# Patient Record
Sex: FEMALE | Race: BLACK OR AFRICAN AMERICAN
[De-identification: names, ages, dates, MRNs, and addresses within clinical notes are randomized per-mention and may not be internally consistent; named-entity substitution may affect disease eponyms.]

---

## 2018-02-10 ENCOUNTER — HOSPITAL ENCOUNTER (OUTPATIENT)
Dept: HOSPITAL 17 - NEPE | Age: 48
Setting detail: OBSERVATION
LOS: 1 days | Discharge: HOME | End: 2018-02-11
Payer: COMMERCIAL

## 2018-02-10 VITALS
HEART RATE: 79 BPM | DIASTOLIC BLOOD PRESSURE: 112 MMHG | SYSTOLIC BLOOD PRESSURE: 235 MMHG | RESPIRATION RATE: 16 BRPM | OXYGEN SATURATION: 98 % | TEMPERATURE: 98.8 F

## 2018-02-10 VITALS
HEART RATE: 75 BPM | DIASTOLIC BLOOD PRESSURE: 107 MMHG | SYSTOLIC BLOOD PRESSURE: 185 MMHG | RESPIRATION RATE: 18 BRPM | OXYGEN SATURATION: 100 %

## 2018-02-10 DIAGNOSIS — I25.10: ICD-10-CM

## 2018-02-10 DIAGNOSIS — E78.5: ICD-10-CM

## 2018-02-10 DIAGNOSIS — R07.9: Primary | ICD-10-CM

## 2018-02-10 DIAGNOSIS — F17.200: ICD-10-CM

## 2018-02-10 DIAGNOSIS — I16.9: ICD-10-CM

## 2018-02-10 DIAGNOSIS — E11.9: ICD-10-CM

## 2018-02-10 DIAGNOSIS — Z83.3: ICD-10-CM

## 2018-02-10 DIAGNOSIS — I10: ICD-10-CM

## 2018-02-10 PROCEDURE — 84484 ASSAY OF TROPONIN QUANT: CPT

## 2018-02-10 PROCEDURE — 93005 ELECTROCARDIOGRAM TRACING: CPT

## 2018-02-10 PROCEDURE — 85610 PROTHROMBIN TIME: CPT

## 2018-02-10 PROCEDURE — 85025 COMPLETE CBC W/AUTO DIFF WBC: CPT

## 2018-02-10 PROCEDURE — 93017 CV STRESS TEST TRACING ONLY: CPT

## 2018-02-10 PROCEDURE — 71045 X-RAY EXAM CHEST 1 VIEW: CPT

## 2018-02-10 PROCEDURE — 99285 EMERGENCY DEPT VISIT HI MDM: CPT

## 2018-02-10 PROCEDURE — G0378 HOSPITAL OBSERVATION PER HR: HCPCS

## 2018-02-10 PROCEDURE — 80048 BASIC METABOLIC PNL TOTAL CA: CPT

## 2018-02-10 PROCEDURE — 82552 ASSAY OF CPK IN BLOOD: CPT

## 2018-02-10 PROCEDURE — 82550 ASSAY OF CK (CPK): CPT

## 2018-02-10 PROCEDURE — 83735 ASSAY OF MAGNESIUM: CPT

## 2018-02-10 NOTE — PD
HPI


Chief Complaint:  Chest Pain


Time Seen by Provider:  23:30


Travel History


International Travel<30 days:  No


Contact w/Intl Traveler<30days:  No


Traveled to known affect area:  No





History of Present Illness


HPI


47-year-old female came to the emergency room with history of substernal chest 

pressure that has been going on and off for past 2 days.  She was getting ready 

to go to work today when it started coming back again and decided to come to 

the emergency room.  Patient says normally she does not get chest pain.  No 

aggravating or relieving factors identified.  No radiation of the pain.  

Patient is a diabetic and history of hypertension.  She smokes occasionally.  

No family history of heart attack.  Patient had a stress test more than 10 

years ago which was negative.  She was significantly hypertensive in triage.  

Her blood pressure has come down somewhat after she was brought into the room.





[facility]


Past Medical History


*** Narrative Medical


List of her past medical, surgical, social and family history is reviewed from 

the nursing note.


Diabetes:  Yes


Patient Takes Glucophage:  Yes (METFORMIN )


Diminished Hearing:  No


Hypertension:  Yes


Pregnant?:  Not Pregnant





Past Surgical History


Surgical History:  No Previous Surgery





Social History


Alcohol Use:  No


Tobacco Use:  Yes (BLACK AND MILDS )


Substance Use:  No





Allergies-Medications


(Allergen,Severity, Reaction):  


Coded Allergies:  


     No Known Allergies (Unverified , 2/10/18)


Comments


No known drug allergies.


Reported Meds & Prescriptions





Reported Meds & Active Scripts


Active


Zithromax (Azithromycin) 250 Mg Tab 250 Mg PO AS DIRECTED


     Take 2 tabs (500 mg) on day 1 then 1 tab daily x 4 days.





Narrative Medication


Awaiting for the nurse to do the med reconciliation.





Review of Systems


Except as stated in HPI:  all other systems reviewed are Neg


Cardiovascular:  Positive: Chest Pain or Discomfort





Physical Exam


Narrative


GENERAL: Awake, alert, morbidly obese, mild distress


SKIN: Focused skin assessment warm/dry.


HEAD: Atraumatic. Normocephalic. 


EYES: Pupils equal and round. No scleral icterus. No injection or drainage. 


ENT: No nasal bleeding or discharge.  Mucous membranes pink and moist.


NECK: Trachea midline. No JVD. 


CARDIOVASCULAR: Regular rate and rhythm.  No murmur appreciated.


RESPIRATORY: No accessory muscle use. Clear to auscultation. Breath sounds 

equal bilaterally. 


GASTROINTESTINAL: Abdomen soft, non-tender, nondistended. Hepatic and splenic 

margins not palpable. 


MUSCULOSKELETAL: No obvious deformities. No clubbing.  No cyanosis.  No edema. 


NEUROLOGICAL: Awake and alert. No obvious cranial nerve deficits.  Motor 

grossly within normal limits. Normal speech.


PSYCHIATRIC: Appropriate mood and affect; insight and judgment normal.





Data


Data


Last Documented VS





Vital Signs








  Date Time  Temp Pulse Resp B/P (MAP) Pulse Ox O2 Delivery O2 Flow Rate FiO2


 


2/11/18 00:21  80 16 175/91 (119) 98   


 


2/10/18 22:55 98.8       








Orders





 Orders


Electrocardiogram (2/10/18 23:36)


Basic Metabolic Panel (Bmp) (2/10/18 23:36)


Ckmb (Isoenzyme) Profile (2/10/18 23:36)


Complete Blood Count With Diff (2/10/18 23:36)


Magnesium (Mg) (2/10/18 23:36)


Prothrombin Time / Inr (Pt) (2/10/18 23:36)


Troponin I (2/10/18 23:36)


Chest, Single Ap (2/10/18 23:36)


Ecg Monitoring (2/10/18 23:36)


Bilateral Bp Monitoring (2/10/18 23:36)


Iv Access Insert/Monitor (2/10/18 23:36)


Oximetry (2/10/18 23:36)


Oxygen Administration (2/10/18 23:36)


Aspirin Chew (Aspirin Chew) (2/10/18 23:45)


Sodium Chloride 0.9% Flush (Ns Flush) (2/10/18 23:45)


Nitroglycerin Sl (Nitrostat Sl) (2/10/18 23:45)


CKMB (2/10/18 23:45)


CKMB% (2/10/18 23:45)


Admit Order (Ed Use Only) (2/11/18 01:11)


Place In Observation (2/11/18 01:11)


Activity Bed Rest With Brp (2/11/18 01:11)


Vital Signs (Adult) Q4H (2/11/18 01:11)


Cardiac Rhythm .As Directed (2/11/18 01:11)


Notify Dr: Other .PRN (2/11/18 01:11)


Notify . Parameters (2/11/18 01:11)


Resp Oxygen Nasal Cannula (2/11/18 )


Diet Heart Healthy (2/11/18 Breakfast)


Ckmb (Isoenzyme) Profile (2/11/18 01:11)


Ckmb (Isoenzyme) Profile (2/11/18 04:11)


Troponin I (2/11/18 01:11)


Troponin I (2/11/18 04:11)


Electrocardiogram (2/11/18 01:11)


Electrocardiogram (2/11/18 04:11)


^ Obtain (2/11/18 01:11)


Sodium Chloride 0.9% Flush (Ns Flush) (2/11/18 01:15)


Sodium Chloride 0.9% Flush (Ns Flush) (2/11/18 09:00)


Acetaminophen (Tylenol) (2/11/18 01:15)


Ondansetron Inj (Zofran Inj) (2/11/18 01:15)


Nitroglycerin Sl (Nitrostat Sl) (2/11/18 01:15)


Cardiac Monitor / Telemetry TERRY.[id number] (2/11/18 01:11)


CKMB (2/11/18 02:05)


CKMB% (2/11/18 02:05)


CKMB (2/11/18 05:47)


CKMB% (2/11/18 05:47)





Labs





Laboratory Tests








Test


  2/10/18


23:45


 


White Blood Count 7.1 TH/MM3 


 


Red Blood Count 4.30 MIL/MM3 


 


Hemoglobin 12.9 GM/DL 


 


Hematocrit 38.4 % 


 


Mean Corpuscular Volume 89.3 FL 


 


Mean Corpuscular Hemoglobin 30.0 PG 


 


Mean Corpuscular Hemoglobin


Concent 33.6 % 


 


 


Red Cell Distribution Width 12.5 % 


 


Platelet Count 270 TH/MM3 


 


Mean Platelet Volume 8.6 FL 


 


Neutrophils (%) (Auto) 52.5 % 


 


Lymphocytes (%) (Auto) 36.1 % 


 


Monocytes (%) (Auto) 9.5 % 


 


Eosinophils (%) (Auto) 1.4 % 


 


Basophils (%) (Auto) 0.5 % 


 


Neutrophils # (Auto) 3.7 TH/MM3 


 


Lymphocytes # (Auto) 2.6 TH/MM3 


 


Monocytes # (Auto) 0.7 TH/MM3 


 


Eosinophils # (Auto) 0.1 TH/MM3 


 


Basophils # (Auto) 0.0 TH/MM3 


 


CBC Comment DIFF FINAL 


 


Differential Comment  


 


Prothrombin Time 11.2 SEC 


 


Prothromb Time International


Ratio 1.1 RATIO 


 


 


Blood Urea Nitrogen 10 MG/DL 


 


Creatinine 0.63 MG/DL 


 


Random Glucose 133 MG/DL 


 


Calcium Level 8.7 MG/DL 


 


Magnesium Level 1.7 MG/DL 


 


Sodium Level 137 MEQ/L 


 


Potassium Level 4.3 MEQ/L 


 


Chloride Level 105 MEQ/L 


 


Carbon Dioxide Level 25.1 MEQ/L 


 


Anion Gap 7 MEQ/L 


 


Estimat Glomerular Filtration


Rate 122 ML/MIN 


 


 


Total Creatine Kinase 256 U/L 


 


Creatine Kinase MB 3.0 NG/ML 


 


Creatine Kinase MB % 1.2 % 


 


Troponin I


  LESS THAN 0.02


NG/ML











MDM


Medical Decision Making


Medical Screen Exam Complete:  Yes


Emergency Medical Condition:  Yes


Medical Record Reviewed:  Yes


Interpretation(s)


Twelve-lead EKG was reviewed by me.  Normal sinus rhythm, normal axis, 

nonspecific ST-T wave changes.  Heart rate of 68 bpm.


Differential Diagnosis


ACS, non-STEMI, nonspecific chest pain


Narrative Course


12:36 AM awaiting for the blood test results to come back.  Patient was given 

aspirin and sublingual nitroglycerin.  Blood pressure is coming down.





Procedures


EKG Prior to Arrival:  No





Diagnosis





 Primary Impression:  


 Hypertensive crisis


 Additional Impression:  


 Chest pain


 Qualified Codes:  R07.9 - Chest pain, unspecified





Admitting Information


Admitting Physician Requests:  Observation


Scripts


Azithromycin (Zithromax) 250 Mg Tab


250 MG PO AS DIRECTED for Infection, #6 TAB 0 Refills


   Take 2 tabs (500 mg) on day 1 then 1 tab daily x 4 days.


   Prov: Desire Gar         2/11/18











Jaret Roberts MD Feb 10, 2018 23:32

## 2018-02-11 VITALS
SYSTOLIC BLOOD PRESSURE: 169 MMHG | OXYGEN SATURATION: 97 % | HEART RATE: 70 BPM | TEMPERATURE: 96.5 F | RESPIRATION RATE: 20 BRPM | DIASTOLIC BLOOD PRESSURE: 79 MMHG

## 2018-02-11 VITALS
DIASTOLIC BLOOD PRESSURE: 91 MMHG | RESPIRATION RATE: 16 BRPM | OXYGEN SATURATION: 98 % | SYSTOLIC BLOOD PRESSURE: 175 MMHG | HEART RATE: 80 BPM

## 2018-02-11 VITALS
OXYGEN SATURATION: 97 % | SYSTOLIC BLOOD PRESSURE: 168 MMHG | RESPIRATION RATE: 18 BRPM | DIASTOLIC BLOOD PRESSURE: 99 MMHG

## 2018-02-11 VITALS
RESPIRATION RATE: 18 BRPM | HEART RATE: 68 BPM | OXYGEN SATURATION: 99 % | DIASTOLIC BLOOD PRESSURE: 90 MMHG | SYSTOLIC BLOOD PRESSURE: 162 MMHG

## 2018-02-11 VITALS — HEART RATE: 68 BPM

## 2018-02-11 VITALS — HEART RATE: 72 BPM

## 2018-02-11 VITALS
DIASTOLIC BLOOD PRESSURE: 93 MMHG | SYSTOLIC BLOOD PRESSURE: 167 MMHG | RESPIRATION RATE: 18 BRPM | OXYGEN SATURATION: 96 % | HEART RATE: 72 BPM | TEMPERATURE: 97.8 F

## 2018-02-11 LAB
BASOPHILS # BLD AUTO: 0 TH/MM3 (ref 0–0.2)
BASOPHILS NFR BLD: 0.5 % (ref 0–2)
BUN SERPL-MCNC: 10 MG/DL (ref 7–18)
CALCIUM SERPL-MCNC: 8.7 MG/DL (ref 8.5–10.1)
CHLORIDE SERPL-SCNC: 105 MEQ/L (ref 98–107)
CREAT SERPL-MCNC: 0.63 MG/DL (ref 0.5–1)
EOSINOPHIL # BLD: 0.1 TH/MM3 (ref 0–0.4)
EOSINOPHIL NFR BLD: 1.4 % (ref 0–4)
ERYTHROCYTE [DISTWIDTH] IN BLOOD BY AUTOMATED COUNT: 12.5 % (ref 11.6–17.2)
GFR SERPLBLD BASED ON 1.73 SQ M-ARVRAT: 122 ML/MIN (ref 89–?)
GLUCOSE SERPL-MCNC: 133 MG/DL (ref 74–106)
HCO3 BLD-SCNC: 25.1 MEQ/L (ref 21–32)
HCT VFR BLD CALC: 38.4 % (ref 35–46)
HGB BLD-MCNC: 12.9 GM/DL (ref 11.6–15.3)
INR PPP: 1.1 RATIO
LYMPHOCYTES # BLD AUTO: 2.6 TH/MM3 (ref 1–4.8)
LYMPHOCYTES NFR BLD AUTO: 36.1 % (ref 9–44)
MAGNESIUM SERPL-MCNC: 1.7 MG/DL (ref 1.5–2.5)
MCH RBC QN AUTO: 30 PG (ref 27–34)
MCHC RBC AUTO-ENTMCNC: 33.6 % (ref 32–36)
MCV RBC AUTO: 89.3 FL (ref 80–100)
MONOCYTE #: 0.7 TH/MM3 (ref 0–0.9)
MONOCYTES NFR BLD: 9.5 % (ref 0–8)
NEUTROPHILS # BLD AUTO: 3.7 TH/MM3 (ref 1.8–7.7)
NEUTROPHILS NFR BLD AUTO: 52.5 % (ref 16–70)
PLATELET # BLD: 270 TH/MM3 (ref 150–450)
PMV BLD AUTO: 8.6 FL (ref 7–11)
PROTHROMBIN TIME: 11.2 SEC (ref 9.8–11.6)
RBC # BLD AUTO: 4.3 MIL/MM3 (ref 4–5.3)
SODIUM SERPL-SCNC: 137 MEQ/L (ref 136–145)
TROPONIN I SERPL-MCNC: (no result) NG/ML (ref 0.02–0.05)
WBC # BLD AUTO: 7.1 TH/MM3 (ref 4–11)

## 2018-02-11 NOTE — RADRPT
EXAM DATE/TIME:  02/10/2018 23:49 

 

[facility] COMPARISON:     

No previous studies available for comparison.

 

                     

INDICATIONS :     

Chest pain.

                     

 

MEDICAL HISTORY :     

None.          

 

SURGICAL HISTORY :     

None.   

 

ENCOUNTER:     

Initial                                        

 

ACUITY:     

2 days      

 

PAIN SCORE:     

7/10

 

LOCATION:     

Bilateral chest 

 

FINDINGS:     

A single view of the chest demonstrates the lungs to be symmetrically aerated without evidence of mas
s, infiltrate or effusion.  The cardiomediastinal contours are unremarkable.  Osseous structures are 
intact.

 

CONCLUSION:     

1. No acute findings. Mildly tortuous aorta.

 

 

 

 John Morton MD on February 11, 2018 at 0:53           

Board Certified Radiologist.

 This report was verified electronically.

## 2018-02-11 NOTE — TR
Date Performed: 02/11/2018       Time Performed: 10:39:46

 

DOCTOR:      Reyes Hernandez 

 

DRUG LIST:     

CLINICAL HISTORY:      CHEST PAIN

REASON FOR TEST:      Chest pain

REASON FOR ENDING:     

OBSERVATION:     

CONCLUSION:      Eddie protocol completed. Stopped sec to reaching target heart rate and leg fatigue.
 Maximum WS=023 Maximum EO=309/80 Total Exercise Time=8:01Target HR Achieved=85.0%. No reprod chest p
ain. No ectopy. Good exercise tolerance. ST and T waves changes are nondiagnostic. Normal bp response
. Recovery quick and unremarkable.

COMMENTS:

## 2018-02-11 NOTE — HHI.HP
HPI


Primary Care Physician


Dr. Zoë Camejo


Chief Complaint


Chest pain


History of Present Illness


47-year-old female with known hypertension and type 2 diabetes presents to 

emergency room for further evaluation of chest pain.  Onset 3 days ago.  

Location substernal.  Characterized as "an elephant sitting on my chest." 

Severity moderate.  Radiation to mid back.  Duration generally last hours at a 

time.  No associated symptoms as nausea, vomiting, or dyspnea.  Does not hurt 

to take a deep breath.  Endorses diaphoresis.  No known precipitating or 

relieving factors.  Chest discomfort occurred intermittently over last few 

days. A close friend encouraged her to seek further evaluation, therefore came 

to ER for further evaluation.  Endorses current chest congestion with 

nonproductive cough since Monday.  No recent fever, chills, or illness.  Denies 

similar pain in the past.





Review of Systems


General: No fatigue,weakness, fever, chills, or recent illness change in 

appetite. Has been in her general state of health.  


HEENT: No HA, no vision changes, no nasal congestion or drainage, no dysphasia


CV: As states above, denying any current CP, pressure, or discomfort. No 

palpitations, intermittent leg pain, or dizziness.


RESP: Recent chest congestion with rare nonproductive cough since Monday. No 

wheeze or sputum production. No history of asthma.


GI: No nausea, vomiting, bowel changes. Reports frequency periods of 

constipation. 


: No dysuria, urgency, frequency, or frequent UTIs.


EXT: Occasional intermittent dependent lower leg edema, generally resolves with 

elevation of lower extremities. No paraesthesias


MS: No discomfort, change in ROM, injury, recent fall, or trauma. 


NEURO: No difficulty with balance, LOC, motor/sensory deficits


PSYCH: No anxiety, depression, suicidal ideation


SKIN: No rashes, no concerning lesions





Past Family Social History


Allergies:  


Coded Allergies:  


     No Known Allergies (Unverified , 2/10/18)


Past Medical History


 Hypertension, type II diabetes


Reported Medications


Hypertension, non-insulin dependent type II diabetes


Active Ordered Medications





Current Medications








 Medications


  (Trade)  Dose


 Ordered  Sig/David


 Route  Start Time


 Stop Time Status Last Admin


 


  (NS Flush)  2 ml  UNSCH  PRN


 IV FLUSH  18 01:15


     


 


 


  (NS Flush)  2 ml  BID


 IV FLUSH  18 09:00


     


 


 


  (Tylenol)  500 mg  Q4H  PRN


 PO  18 01:15


    18 03:39


 


 


  (Zofran Inj)  4 mg  Q6H  PRN


 IV PUSH  18 01:15


     


 


 


  (Nitrostat Sl)  0.4 mg  Q5M  PRN


 SL  18 01:15


     


 


 


  (Aspirin)  325 mg  DAILY


 PO  18 09:00


     


 








Family History


Noncontributory for early onset cardiovascular disease


Social History


Known hypertension and diabetes. Known known hyperlipidemia or CAD.


Recently began smoking Black/Mild cigars daily x2 weeks. Denies any alcohol or 

illegal drug use.


Works two jobs. Endorses keeping active and walks local bridges as able.





Past cardiac testing


Treadmill ETT approximately 10 years ago.





Physical Exam


Vital Signs





Vital Signs








  Date Time  Temp Pulse Resp B/P (MAP) Pulse Ox O2 Delivery O2 Flow Rate FiO2


 


18 06:57 97.8 72 18 167/93 (117) 96   


 


18 04:00  68      


 


18 03:10 96.5 70 20 169/79 (109) 97   


 


18 03:03        


 


18 03:03  68 18 162/90 (114) 99   


 


18 03:00   18     


 


18 00:21  80 16 175/91 (119) 98   


 


2/10/18 23:25  75      


 


2/10/18 23:21  75 18 185/107 (133) 100   


 


2/10/18 22:55 98.8 79 16 235/112 (153) 98   








Physical Exam


GENERAL: Alert WN, WD, NAD, pleasant, moderate obese, at American female


HEAD: NC, AT


EYES: Sclera clear, conjunctiva without injection, pupils equal and round


ENT: Mucous membranes pink and moist


CV: RRR, without murmur, rub, gallop, no JVD, S1-S2 no S3-S4.  Chest wall 

nontender with palpation.


RESP: Clear lungs throughout bilateral, no crackles, wheeze, rhonchi, 

symmetrical chest rise, nonlabored, able to speak in full sentences


ABD: Soft, NT, ND, no masses, positive bowel tones


EXT: Pulses +24, no dependent edema


MS: Normal tone 4 extremities, no obvious deformities, full range of motion


NEURO: CN II through CN XII grossly intact, motor strength 5/5


PSYCH: A+O 3, pleasant affect, appropriate speech and mood. 


SKIN: Normal turgor, normal texture, no lesions, no rashes, multiple tattoos


Laboratory





Laboratory Tests








Test


  2/10/18


23:45 18


02:05 18


05:47


 


White Blood Count 7.1   


 


Red Blood Count 4.30   


 


Hemoglobin 12.9   


 


Hematocrit 38.4   


 


Mean Corpuscular Volume 89.3   


 


Mean Corpuscular Hemoglobin 30.0   


 


Mean Corpuscular Hemoglobin


Concent 33.6 


  


  


 


 


Red Cell Distribution Width 12.5   


 


Platelet Count 270   


 


Mean Platelet Volume 8.6   


 


Neutrophils (%) (Auto) 52.5   


 


Lymphocytes (%) (Auto) 36.1   


 


Monocytes (%) (Auto) 9.5   


 


Eosinophils (%) (Auto) 1.4   


 


Basophils (%) (Auto) 0.5   


 


Neutrophils # (Auto) 3.7   


 


Lymphocytes # (Auto) 2.6   


 


Monocytes # (Auto) 0.7   


 


Eosinophils # (Auto) 0.1   


 


Basophils # (Auto) 0.0   


 


CBC Comment DIFF FINAL   


 


Differential Comment    


 


Prothrombin Time 11.2   


 


Prothromb Time International


Ratio 1.1 


  


  


 


 


Blood Urea Nitrogen 10   


 


Creatinine 0.63   


 


Random Glucose 133   


 


Calcium Level 8.7   


 


Magnesium Level 1.7   


 


Sodium Level 137   


 


Potassium Level 4.3   


 


Chloride Level 105   


 


Carbon Dioxide Level 25.1   


 


Anion Gap 7   


 


Estimat Glomerular Filtration


Rate 122 


  


  


 


 


Total Creatine Kinase 256  164  140 


 


Creatine Kinase MB 3.0  2.4  2.1 


 


Creatine Kinase MB % 1.2   


 


Troponin I LESS THAN 0.02  LESS THAN 0.02  LESS THAN 0.02 








Result Diagram:  


2/10/18 2345                                                                   

             2/10/18 2345





Imaging





Last 48 hours Impressions








Chest X-Ray 2/10/18 2336 Signed





Impressions: 





 Service Date/Time:  Saturday, February 10, 2018 23:49 - CONCLUSION:  1. No 

acute 





 findings. Mildly tortuous aorta.     John Morton MD 








Course


EKG


NSR, normal axis, no st t segment changes





Caprini VTE Risk Assessment


Caprini VTE Risk Assessment:  No/Low Risk (score <= 1)


Caprini Risk Assessment Model











 Point Value = 1          Point Value = 2  Point Value = 3  Point Value = 5


 


Age 41-60


Minor surgery


BMI > 25 kg/m2


Swollen legs


Varicose veins


Pregnancy or postpartum


History of unexplained or recurrent


   spontaneous 


Oral contraceptives or hormone


   replacement


Sepsis (< 1 month)


Serious lung disease, including


   pneumonia (< 1 month)


Abnormal pulmonary function


Acute myocardial infarction


Congestive heart failure (< 1 month)


History of inflammatory bowel disease


Medical patient at bed rest Age 61-74


Arthroscopic surgery


Major open surgery (> 45 min)


Laparoscopic surgery (> 45 min)


Malignancy


Confined to bed (> 72 hours)


Immobilizing plaster cast


Central venous access Age >= 75


History of VTE


Family history of VTE


Factor V Leiden


Prothrombin 78004A


Lupus anticoagulant


Anticardiolipin antibodies


Elevated serum homocysteine


Heparin-induced thrombocytopenia


Other congenital or acquired


   thrombophilia Stroke (< 1 month)


Elective arthroplasty


Hip, pelvis, or leg fracture


Acute spinal cord injury (< 1 month)








Prophylaxis Regimen











   Total Risk


Factor Score Risk Level Prophylaxis Regimen


 


0-1      Low Early ambulation


 


2 Moderate Order ONE of the following:


*Sequential Compression Device (SCD)


*Heparin 5000 units SQ BID


 


3-4 Higher Order ONE of the following medications:


*Heparin 5000 units SQ TID


*Enoxaparin/Lovenox 40 mg SQ daily (WT < 150 kg, CrCl > 30 mL/min)


*Enoxaparin/Lovenox 30 mg SQ daily (WT < 150 kg, CrCl > 10-29 mL/min)


*Enoxaparin/Lovenox 30 mg SQ BID (WT < 150 kg, CrCl > 30 mL/min)


AND/OR


*Sequential Compression Device (SCD)


 


5 or more Highest Order ONE of the following medications:


*Heparin 5000 units SQ TID (Preferred with Epidurals)


*Enoxaparin/Lovenox 40 mg SQ daily (WT < 150 kg, CrCl > 30 mL/min)


*Enoxaparin/Lovenox 30 mg SQ daily (WT < 150 kg, CrCl > 10-29 mL/min)


*Enoxaparin/Lovenox 30 mg SQ BID (WT < 150 kg, CrCl > 30 mL/min)


AND


*Sequential Compression Device (SCD)











Assessment and Plan


Assessment and Plan


#1 Atypical chest pain-admitted to chest pain center.  Ruled out with 2 sets of 

EKGs and cardiac enzymes, third set pending.  Monitor on telemetry overnight.  

Will be seen and evaluated by dr. Reyes Hernandez. discussed likelihood of 

completing exercise cardiac stress testing later this morning.This will be 

determined after evaluation by cardiologist. Patient is agreeable to plan of 

care.  


#2 History of hypertension-discussed importance of tight blood pressure control

, smoking cessation, adapting a low sodium diet.


#3 History of type II diabetes-discussed importance of tight glucose control, 

benefits of daily activity, lifestyle modifications to include dietary changes 

and decreasing weight and keeping all follow up PCP appointments. 


#4 Tobacco use-show encouraged and stressed the importance of tobacco 

cessation.  Instructed to quit smoking.











Desire Gar 2018 08:55

## 2018-02-11 NOTE — EKG
Date Performed: 02/10/2018       Time Performed: 23:17:40

 

PTAGE:      47 years

 

EKG:      Sinus rhythm 

 

 NORMAL ECG 

 

NO PREVIOUS TRACING            

 

DOCTOR:   Reyes Hernandez  Interpretating Date/Time  02/11/2018 10:16:30

## 2018-02-11 NOTE — PD.CARD.PN
Subjective


Subjective Remarks


Very pleasant 47-year-old lady with history of diabetes and hypertension.  2 

days ago she began to notice episodes of pressure in the middle of her chest 

that seemed to radiate through to her back.  She felt if she could "crack" her 

back she would get relief.  These episodes were also associated with some sense 

of clamminess or diaphoresis.  However she also states that she has been very 

congested with a cough and some greenish hard sputum.  She is also been exposed 

to a good bit of the flu as she works as a  at [employer].  She has also 

been under a considerable amount of stress, is trying to work 2 jobs, mother 

 from complications of diabetes, and older brother just went on dialysis as 

complication of diabetes.  She is very concerned about taking care of herself 

managing her high blood pressure and her diabetes.





Objective


Medications





Current Medications








 Medications


  (Trade)  Dose


 Ordered  Sig/David


 Route  Start Time


 Stop Time Status Last Admin


 


  (NS Flush)  2 ml  UNSCH  PRN


 IV FLUSH  18 01:15


     


 


 


  (NS Flush)  2 ml  BID


 IV FLUSH  18 09:00


    18 09:15


 


 


  (Tylenol)  500 mg  Q4H  PRN


 PO  18 01:15


    18 03:39


 


 


  (Zofran Inj)  4 mg  Q6H  PRN


 IV PUSH  18 01:15


     


 


 


  (Nitrostat Sl)  0.4 mg  Q5M  PRN


 SL  18 01:15


     


 


 


  (Aspirin)  325 mg  DAILY


 PO  18 09:00


    18 09:14


 








Vital Signs / I&O





Vital Signs








  Date Time  Temp Pulse Resp B/P (MAP) Pulse Ox O2 Delivery O2 Flow Rate FiO2


 


18 06:57 97.8 72 18 167/93 (117) 96   


 


18 04:00  68      


 


18 03:10 96.5 70 20 169/79 (109) 97   


 


18 03:03        


 


18 03:03  68 18 162/90 (114) 99   


 


18 03:00   18     


 


18 00:21  80 16 175/91 (119) 98   


 


2/10/18 23:25  75      


 


2/10/18 23:21  75 18 185/107 (133) 100   


 


2/10/18 22:55 98.8 79 16 235/112 (714) 57   








Physical Exam


GENERAL: Well-nourished well-developed but obese black lady


SKIN: Warm and dry.


HEAD: Atraumatic. Normocephalic. 


EYES: Pupils equal and round. No scleral icterus. No injection or drainage. 


ENT: No nasal bleeding or discharge.  Mucous membranes pink and moist.


NECK: Trachea midline. No JVD. 


CARDIOVASCULAR: Regular rate and rhythm.  No gallops or rubs but a soft 2/6 

systolic murmur along the left sternal border


RESPIRATORY: No accessory muscle use. Clear to auscultation. Breath sounds 

equal bilaterally. 


GASTROINTESTINAL: Abdomen soft, non-tender, nondistended. Hepatic and splenic 

margins not palpable. 


MUSCULOSKELETAL: Extremities without clubbing, cyanosis, or edema. No obvious 

deformities. 


NEUROLOGICAL: Awake and alert. No obvious cranial nerve deficits.  Motor 

grossly within normal limits. Five out of 5 muscle strength in the arms and 

legs.  Normal speech.


PSYCHIATRIC: Appropriate mood and affect; insight and judgment normal.


Laboratory





Laboratory Tests








Test


  2/10/18


23:45 18


02:05 18


05:47


 


White Blood Count 7.1 TH/MM3   


 


Red Blood Count 4.30 MIL/MM3   


 


Hemoglobin 12.9 GM/DL   


 


Hematocrit 38.4 %   


 


Mean Corpuscular Volume 89.3 FL   


 


Mean Corpuscular Hemoglobin 30.0 PG   


 


Mean Corpuscular Hemoglobin


Concent 33.6 % 


  


  


 


 


Red Cell Distribution Width 12.5 %   


 


Platelet Count 270 TH/MM3   


 


Mean Platelet Volume 8.6 FL   


 


Neutrophils (%) (Auto) 52.5 %   


 


Lymphocytes (%) (Auto) 36.1 %   


 


Monocytes (%) (Auto) 9.5 %   


 


Eosinophils (%) (Auto) 1.4 %   


 


Basophils (%) (Auto) 0.5 %   


 


Neutrophils # (Auto) 3.7 TH/MM3   


 


Lymphocytes # (Auto) 2.6 TH/MM3   


 


Monocytes # (Auto) 0.7 TH/MM3   


 


Eosinophils # (Auto) 0.1 TH/MM3   


 


Basophils # (Auto) 0.0 TH/MM3   


 


CBC Comment DIFF FINAL   


 


Differential Comment    


 


Prothrombin Time 11.2 SEC   


 


Prothromb Time International


Ratio 1.1 RATIO 


  


  


 


 


Blood Urea Nitrogen 10 MG/DL   


 


Creatinine 0.63 MG/DL   


 


Random Glucose 133 MG/DL   


 


Calcium Level 8.7 MG/DL   


 


Magnesium Level 1.7 MG/DL   


 


Sodium Level 137 MEQ/L   


 


Potassium Level 4.3 MEQ/L   


 


Chloride Level 105 MEQ/L   


 


Carbon Dioxide Level 25.1 MEQ/L   


 


Anion Gap 7 MEQ/L   


 


Estimat Glomerular Filtration


Rate 122 ML/MIN 


  


  


 


 


Total Creatine Kinase 256 U/L  164 U/L  140 U/L 


 


Creatine Kinase MB 3.0 NG/ML  2.4 NG/ML  2.1 NG/ML 


 


Creatine Kinase MB % 1.2 %   


 


Troponin I


  LESS THAN 0.02


NG/ML LESS THAN 0.02


NG/ML LESS THAN 0.02


NG/ML








Imaging





Last 24 hours Impressions








Chest X-Ray 2/10/18 7476 Signed





Impressions: 





 Service Date/Time:  Saturday, February 10, 2018 23:49 - CONCLUSION:  1. No 

acute 





 findings. Mildly tortuous aorta.     John Morton MD 











Assessment and Plan


Problem List:  


(1) Diabetes


ICD Codes:  E11.9 - Type 2 diabetes mellitus without complications


Status:  Chronic


(2) Hypertension


ICD Codes:  I10 - Essential (primary) hypertension


Status:  Chronic


(3) Chest pain


ICD Codes:  R07.9 - Chest pain, unspecified


Status:  Acute


Plan:  We will rule out ACS using chest pain protocol then ETT


If negative discharge with instructions for OTC cold medication and antibiotic


Encouraged to pursue healthy lifestyle with no smoking exercise and weight 

reduction








Problem Qualifiers





(1) Chest pain:  


Qualified Codes:  R07.9 - Chest pain, unspecified








Reyes Hernandez MD 2018 09:46

## 2018-02-11 NOTE — [facility]DCP[facility]
Discharge Care Plan


Diagnosis:  


(1) Atypical chest pain


Goals to Promote Your Health


* To prevent worsening of your condition and complications


* To maintain your health at the optimal level


Directions to Meet Your Goals


*** Take your medications as prescribed


*** Follow your dietary instruction


*** Follow activity as directed








*** Keep your appointments as scheduled


*** Take your immunizations and boosters as scheduled


*** If your symptoms worsen call your PCP, if no PCP go to Urgent Care Center 

or Emergency Room***


*** Smoking is Dangerous to Your Health. Avoid second hand smoke***


***Call the 24-hour hour crisis hotline for domestic abuse at 1-252.819.6305***











Desire Gar Feb 11, 2018 11:37

## 2018-02-11 NOTE — EKG
Date Performed: 02/11/2018       Time Performed: 02:47:39

 

PTAGE:      47 years

 

EKG:      Sinus rhythm 

 

 NORMAL ECG NO CHANGE 

 

NO PREVIOUS TRACING            

 

DOCTOR:   Reyes Hernandez  Interpretating Date/Time  02/11/2018 10:15:35

## 2018-02-13 NOTE — EKG
Date Performed: 2018       Time Performed: 09:19:24

 

PTAGE:      47 years

 

EKG:      Sinus rhythm 

 

 NORMAL ECG Since 

 

 PREVIOUS TRACING            , no significant change noted PREVIOUS TRACIN2018 02.47

 

DOCTOR:   Poornima Zuluaga  Interpretating Date/Time  2018 18:11:02